# Patient Record
(demographics unavailable — no encounter records)

---

## 2025-05-29 NOTE — PHYSICAL EXAM
[Chaperoned Physical Exam] : A chaperone was present in the examining room during all aspects of the physical examination. [MA] : MA [Appropriately responsive] : appropriately responsive [Alert] : alert [No Acute Distress] : no acute distress [No Lymphadenopathy] : no lymphadenopathy [Regular Rate Rhythm] : regular rate rhythm [No Murmurs] : no murmurs [Clear to Auscultation B/L] : clear to auscultation bilaterally [Soft] : soft [Non-tender] : non-tender [Non-distended] : non-distended [No HSM] : No HSM [No Lesions] : no lesions [No Mass] : no mass [Oriented x3] : oriented x3 [Examination Of The Breasts] : a normal appearance [No Masses] : no breast masses were palpable [Labia Majora] : normal [Labia Minora] : normal [Normal] : normal [Uterine Adnexae] : normal [FreeTextEntry2] : EMMETT LOREDO

## 2025-05-29 NOTE — PLAN
[FreeTextEntry1] : 20-year-old G1, P0 LMP 2/22/2025 with a given EDC of 11/22/2025 by sono Pelvic sono consistent with given EDC of 11/22/2025 GC chlamydia urine culture Prenatal vitamins discussed with patient and urged Lviller recommended and prenatal vitamins sent to pharmacy Self breast exam encouraged Hospital and office protocols discussed PHQ-9 reviewed with patient no interventions needed total time 5 minutes Routine bloods given early GCT Dietary instructions given Records to be retrieved from prior practice Follow-up 4 weeks

## 2025-05-29 NOTE — HISTORY OF PRESENT ILLNESS
[FreeTextEntry1] : 20-year-old G1, P0 LMP 2/22/2025 with a given EDC of 11/22/2025 by another practice by zita with missed menses.  Patient's status post nips and nuchal translucency and blood work that is partially completed.  Patient without any complaints and has not been on a prenatal vitamin to date patient with an estimated gestational age of 14 weeks and 5 days nips test - 5/15/2025 Past medical history none Gardasil positive COVID-negative Flu positive Surgical history none Allergies penicillin rash Medications none GYN history 11/28/5 no STDs no cysts no fibroids no Pap no mammo no sono sexually active Family history no GYN or GI cancers no MR x 2 Jainism x 2 diabetes her great-grandmother no history of preeclampsia Social history no alcohol no smoking no marijuana she is  lives with her  has no pets and is a student scored a 4 on the PHQ-9